# Patient Record
Sex: FEMALE | Race: WHITE
[De-identification: names, ages, dates, MRNs, and addresses within clinical notes are randomized per-mention and may not be internally consistent; named-entity substitution may affect disease eponyms.]

---

## 2018-02-21 VITALS — DIASTOLIC BLOOD PRESSURE: 91 MMHG | SYSTOLIC BLOOD PRESSURE: 128 MMHG

## 2018-02-23 ENCOUNTER — HOSPITAL ENCOUNTER (OUTPATIENT)
Dept: HOSPITAL 89 - ONC | Age: 48
LOS: 33 days | Discharge: HOME | End: 2018-03-28
Attending: NURSE PRACTITIONER
Payer: COMMERCIAL

## 2018-02-23 VITALS — SYSTOLIC BLOOD PRESSURE: 130 MMHG | DIASTOLIC BLOOD PRESSURE: 77 MMHG

## 2018-02-23 VITALS — BODY MASS INDEX: 43.43 KG/M2 | WEIGHT: 265.44 LBS

## 2018-02-23 DIAGNOSIS — I10: ICD-10-CM

## 2018-02-23 DIAGNOSIS — I26.99: Primary | ICD-10-CM

## 2018-02-23 DIAGNOSIS — E72.12: ICD-10-CM

## 2018-02-23 DIAGNOSIS — Z87.891: ICD-10-CM

## 2018-02-23 DIAGNOSIS — D68.61: ICD-10-CM

## 2018-02-23 DIAGNOSIS — R53.83: ICD-10-CM

## 2018-02-23 DIAGNOSIS — R53.1: ICD-10-CM

## 2018-02-23 DIAGNOSIS — I50.9: ICD-10-CM

## 2018-02-23 PROCEDURE — 99212 OFFICE O/P EST SF 10 MIN: CPT

## 2018-02-23 PROCEDURE — 83090 ASSAY OF HOMOCYSTEINE: CPT

## 2018-02-23 PROCEDURE — 36415 COLL VENOUS BLD VENIPUNCTURE: CPT

## 2018-02-23 NOTE — ONCOLOGY FOLLOW UP NOTE
EVENT DATE:  February 23, 2018 



DIAGNOSES

1.  Bilateral pulmonary embolism.

2.  Positive antiphospholipid antibodies.

3.  Congestive heart failure.

4.  Hypertension.



CHIEF COMPLAINT

The patient is here today for followup of her bilateral pulmonary embolism and 
hypercoagulable state.



HEMATOLOGY HISTORY 

The patient is a 46-year-old female who presented recently with left shoulder 
stabbing pain which was followed by shortness of breath.  The patient was 
treated for pneumonia, but without improvement, and her breathing got worse for 
another two days.  She started to swell up with volume overload.  She came to 
the emergency room and CT scan showed bilateral pulmonary embolus.  She was 
also treated with diuretic for congestive heart failure and patient started 
anticoagulation with Xarelto.  She was using Aredia IV, which was not stopped 
because it contains only progesterone.  The patient is here today for 
consultation for further evaluation and management.  She had a thrombophilia 
workup which included prothrombin gene mutation, which came back negative, 
factor V Leiden mutation which came back negative.  Anticardiolipin antibody 
was normal at 2, but anticardiolipin IgM was high at 55.  



Beta-2 glycoprotein 1 antibody IgM came back high at 66.  Homocysteine level 
was normal at 8.  Antithrombin III,  protein S, protein C total and activities 
were normal.  Lupus anticoagulant was negative.  Methylenetetrahydrofolate 
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



The patient is here today for followup of her bilateral pulmonary embolism and 
hypercoagulable state.  She is doing really very good.  Her breathing is very 
good now.  Her repeat CT of chest did not show any evidence of pulmonary emboli
, which was done October 2016.



Patient showed persistently elevated antiphospholipid antibodies consistent 
with antiphospholipid antibody syndrome.



HISTORY OF PRESENT ILLNESS

Patient is here today for follow up of her bilateral pulmonary embolus with 
hypercoagulable state.  She is complaining of generalized musculoskeletal pain 
due to her fibromyalgia.  She is sometimes weak, tired and fatigued, but other 
than that she is stable.  She has some pain in the back of her legs due to 
excessive movement lately, but denies any swelling, warmth or redness, or 
significant pain in that area.  



PAST MEDICAL HISTORY 

1.  Prior pulmonary embolism.

2.  Congestive have failure.

3.  Hypertension.



PAST SURGICAL HISTORY

Cholecystectomy.



FAMILY HISTORY 

Maternal aunt and maternal grandmother both had breast cancer.  Sister with DVT
, but her workup for thrombophilia came back negative.  Brother had DVT after 
surgery.



SOCIAL HISTORY 

The patient is  with two biological and two stepchildren.  She works as 
an  in a .  She quit smoking after one pack a day for ten 
years.  She drinks beer once a month and denies any use of illicit drugs.



CURRENT MEDICATIONS

1.  Lisinopril/hydrochlorothiazide.

2.  Cefdinir.

3.  Celecoxib.

4.  K-Chlor.

5.  Xarelto 20 mg daily.

6.  Tramadol p.r.n. for pain.

7.  Xarelto 20 mg daily.



ALLERGIES

No known drug allergies.



REVIEW OF SYSTEMS 

CONSTITUTIONAL:  No appetite or weight change.  No fever, chills or sweating.  
No recent infection.    

HEENT:  

Ears:  No tinnitus or hearing problem.  

Nose:  No nasal discharge or epistaxis.  

Throat:  No sore throat or mouth ulcers.  

Eyes:  No diplopia or visual changes.  

RESPIRATORY:  No shortness of breath.  No cough, expectoration or hemoptysis.  
  

CARDIOVASCULAR:  No chest pain, orthopnea, or paroxysmal nocturnal dyspnea (PND)
.  No edema.  No palpitations.   

GASTROINTESTINAL:  No nausea or vomiting.  No diarrhea or constipation.  No 
change in bowel movements.  No heartburn or swallowing difficulties.  No 
abdominal pain.  No jaundice.  No hematemesis, melena or rectal bleeding.      
  

GENITOURINARY:  No hematuria or dysuria.    

MUSCULOSKELETAL:  She has generalized musculoskeletal pain due to fibromyalgia.
  

NEUROLOGICAL:  The patient has some neuropathy in her left hand.     

HEMATOLOGIC/LYMPHATIC:  No bleeding or easy bruising.  She is weak, tired and 
fatigued occasionally.  No enlarged lymph nodes.  

SKIN:  No skin rash or lumps.  

PSYCHIATRIC:  No anxiety or depression. 



PHYSICAL EXAMINATION 

GENERAL:  Looks stable.  Well-developed, well-nourished, and in no acute 
distress. 

VITAL SIGNS:  Blood pressure 130/77, pulse 90 per minute, respirations 16 per 
minute, temperature 98.1, pulse ox 91% on room air.   

HEENT:  

Head:  Atraumatic.  No sinus tenderness to palpation.  

Eyes:  No icterus or conjunctivitis.  

Mouth and throat:  No oral thrush or mucositis.    

NECK:  Supple.  No cervical or supraclavicular lymphadenopathy.  

LUNGS:  Clear to auscultation and percussion bilaterally.      

HEART:  Regular rate and rhythm.  No gallops, murmurs, clicks or rubs.

ABDOMEN:  Soft and lax.  No tenderness.  No hepatosplenomegaly.  No masses.     

EXTREMITIES:  No cyanosis, clubbing or edema.    

LYMPHATICS:  No peripheral lymphadenopathy.   

NEUROLOGICAL:  Conscious, alert and oriented times three.  No focal motor or 
sensory deficits.  

PSYCHIATRIC:  Mood and affect appear normal.  

SKIN:  No skin rash, bruise or purpuric eruption.  



DIAGNOSTIC DATA

Homocysteine level is normal at 6.  



ASSESSMENT

1.  Bilateral pulmonary embolism.  Thrombophilia workup came back positive for 
antiphospholipid antibody syndrome with persistently high antiphospholipid 
antibodies, after repeat testing in three months.  She was also homozygous for 
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  Patient is doing fine 
currently and with her diagnosis of bilateral pulmonary embolism and 
antiphospholipid antibody syndrome, she will be maintained on lifelong 
anticoagulation, and she is currently on Xarelto 20 mg daily.  I am planning to 
continue the same treatment.  

2.  Antiphospholipid antibody syndrome with persistently high multiple 
antiphospholipid antibodies.  Because of her high recurrence rate in about one-
third of cases, patient will be maintained on lifelong anticoagulation with 
Xarelto, given that she has bilateral pulmonary embolisms.  

3.  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 
reductase enzyme.  She is currently on folic acid 1 mg, vitamin B complex one 
tablet twice daily.  Her homocysteine level currently is normal at 6.  I am 
planning to continue the vitamin supplements.  I am planning to see her again 
in six months with repeat fasting homocysteine level at that time.

4.  History of congestive heart failure.

5.  Hypertension.



PLAN

1.  Continue Xarelto 20 mg daily lifelong. 

2.  Continue folic acid 1 mg daily.

3.  Continue B complex one tablet twice daily.

4.  Patient to return in six months with fasting homocysteine level.

5.  Patient to contact us for any new concerns or complaints.

ROMYD